# Patient Record
Sex: FEMALE | Race: WHITE | NOT HISPANIC OR LATINO | URBAN - METROPOLITAN AREA
[De-identification: names, ages, dates, MRNs, and addresses within clinical notes are randomized per-mention and may not be internally consistent; named-entity substitution may affect disease eponyms.]

---

## 2017-02-10 ENCOUNTER — OFFICE (OUTPATIENT)
Dept: URBAN - METROPOLITAN AREA CLINIC 11 | Facility: CLINIC | Age: 37
End: 2017-02-10

## 2017-02-10 VITALS
DIASTOLIC BLOOD PRESSURE: 69 MMHG | HEART RATE: 61 BPM | SYSTOLIC BLOOD PRESSURE: 118 MMHG | RESPIRATION RATE: 16 BRPM | HEIGHT: 67 IN | WEIGHT: 160 LBS

## 2017-02-10 DIAGNOSIS — K30 FUNCTIONAL DYSPEPSIA: ICD-10-CM

## 2017-02-10 DIAGNOSIS — K59.00 CONSTIPATION, UNSPECIFIED: ICD-10-CM

## 2017-02-10 PROCEDURE — 99213 OFFICE O/P EST LOW 20 MIN: CPT | Performed by: INTERNAL MEDICINE

## 2017-02-10 RX ORDER — FAMOTIDINE 40 MG/1
TABLET, FILM COATED ORAL
Qty: 30 | Refills: 1 | Status: COMPLETED
Start: 2017-02-10 | End: 2023-10-05

## 2017-02-10 NOTE — SERVICEHPINOTES
"I have been feeling sick after eating for about 10 days."  "It eat the littlest thing and I feel full."  She has an acid regurgitation after eating and also with waking up in the mornings.  She has a feeling of needing to vomiting but doesn't for about an hour.  She will then start belchin and the symptoms have improved.  She has been constipated during this time.  She stated that this was new for her.  She has been taking a dose of Miralax daily with her coffee.  She has been having "little bits of stool" about every couple of days "that is more like diarhea".  She has not had fevers or chills.  She has not had abdominal pain with the new onset of the symptoms.  She has not had any new ill exposures.  She has not had recently new medicaitons.  The symptoms are a bit better after moving her bowels.

## 2017-02-10 NOTE — SERVICENOTES
We discussed her recent on set of issues and a dif dx including dyspepsia, constipation issues, gastritis, ulcer disease, post viral issues, medicaiton effects, enteritis/colitis, tumors etc... At this point, this would seem to be a more benign issue and we can start with her labs and medication changes.  If her sx persist we can consider endoscopic evaluations, CTs, etc...

## 2017-02-12 LAB
CBC COMPLETE BLOOD COUNT W/O DIFF: HEMATOCRIT: 39.7 % (ref 36–48)
CBC COMPLETE BLOOD COUNT W/O DIFF: HEMOGLOBIN: 13.8 G/DL (ref 12–16)
CBC COMPLETE BLOOD COUNT W/O DIFF: MCH: 31.9 PG (ref 25–35)
CBC COMPLETE BLOOD COUNT W/O DIFF: MCHC: 34.8 % (ref 30–38)
CBC COMPLETE BLOOD COUNT W/O DIFF: MCV: 91.7 FL (ref 78–102)
CBC COMPLETE BLOOD COUNT W/O DIFF: PLATELET COUNT: 239 K/UL (ref 150–450)
CBC COMPLETE BLOOD COUNT W/O DIFF: RBC DISTRIBUTION WIDTH: 12.5 % (ref 11.5–16)
CBC COMPLETE BLOOD COUNT W/O DIFF: RED BLOOD CELL COUNT: 4.33 M/UL (ref 4–5.5)
CBC COMPLETE BLOOD COUNT W/O DIFF: WHITE BLOOD CELL COUNT: 5.2 K/UL (ref 4–11)
COMPREHENSIVE METABOLIC PANEL: ALBUMIN: 4.9 G/DL (ref 3.5–5.2)
COMPREHENSIVE METABOLIC PANEL: ALKALINE PHOSPHATASE: 63 U/L (ref 34–115)
COMPREHENSIVE METABOLIC PANEL: CALCIUM TOTAL: 9.3 MG/DL (ref 8.5–10.5)
COMPREHENSIVE METABOLIC PANEL: CARBON DIOXIDE: 22 MEQ/L (ref 21–31)
COMPREHENSIVE METABOLIC PANEL: CHLORIDE: 104 MEQ/L (ref 96–106)
COMPREHENSIVE METABOLIC PANEL: CREATININE: 0.97 MG/DL (ref 0.6–1.3)
COMPREHENSIVE METABOLIC PANEL: FASTING/NON-FASTING: (no result)
COMPREHENSIVE METABOLIC PANEL: GLUCOSE: 95 MG/DL (ref 65–100)
COMPREHENSIVE METABOLIC PANEL: POTASSIUM: 4.6 MEQ/ML (ref 3.5–5.4)
COMPREHENSIVE METABOLIC PANEL: SGOT (AST): 10 U/L — LOW (ref 13–40)
COMPREHENSIVE METABOLIC PANEL: SGPT (ALT): 8 U/L (ref 7–52)
COMPREHENSIVE METABOLIC PANEL: SODIUM: 144 MEQ/L (ref 135–145)
COMPREHENSIVE METABOLIC PANEL: TOTAL BILIRUBIN: 0.8 MG/DL (ref 0.3–1.2)
COMPREHENSIVE METABOLIC PANEL: TOTAL PROTEIN: 7.5 G/DL (ref 6.4–8.3)
COMPREHENSIVE METABOLIC PANEL: UREA NITROGEN: 14 MG/DL (ref 6–20)
Lab: (no result)
URINALYSIS W/MICROSCOPIC: BACTERIA: ABNORMAL
URINALYSIS W/MICROSCOPIC: BILIRUBIN: NEGATIVE
URINALYSIS W/MICROSCOPIC: BLOOD: NEGATIVE
URINALYSIS W/MICROSCOPIC: GLUCOSE: NEGATIVE
URINALYSIS W/MICROSCOPIC: KETONES, URINE: NEGATIVE
URINALYSIS W/MICROSCOPIC: LEUKOCYTE ESTERASE: NEGATIVE
URINALYSIS W/MICROSCOPIC: MUCOUS: (no result)
URINALYSIS W/MICROSCOPIC: NITRITES: NEGATIVE
URINALYSIS W/MICROSCOPIC: PH,URINE: 6 (ref 4.6–8.5)
URINALYSIS W/MICROSCOPIC: PROTEIN: NEGATIVE
URINALYSIS W/MICROSCOPIC: RBC: 0 CELLS/HPF (ref 0–4)
URINALYSIS W/MICROSCOPIC: SPECIFIC GRAVITY: 1.01 (ref 1–1.03)
URINALYSIS W/MICROSCOPIC: SQUAMOUS EPITHELIAL CELL: 1 /HPF
URINALYSIS W/MICROSCOPIC: URINE CLARITY: CLEAR
URINALYSIS W/MICROSCOPIC: URINE COLOR: YELLOW
URINALYSIS W/MICROSCOPIC: WBC: 0 CELLS/HPF (ref 0–2)
URINE CULTURE: C URINE: (no result)

## 2017-02-28 ENCOUNTER — OFFICE (OUTPATIENT)
Dept: URBAN - METROPOLITAN AREA CLINIC 11 | Facility: CLINIC | Age: 37
End: 2017-02-28

## 2017-02-28 VITALS
HEIGHT: 67 IN | HEART RATE: 61 BPM | WEIGHT: 156 LBS | SYSTOLIC BLOOD PRESSURE: 114 MMHG | DIASTOLIC BLOOD PRESSURE: 65 MMHG

## 2017-02-28 DIAGNOSIS — R10.13 EPIGASTRIC PAIN: ICD-10-CM

## 2017-02-28 DIAGNOSIS — R10.12 LEFT UPPER QUADRANT PAIN: ICD-10-CM

## 2017-02-28 PROCEDURE — 99213 OFFICE O/P EST LOW 20 MIN: CPT | Performed by: INTERNAL MEDICINE

## 2017-02-28 NOTE — SERVICEHPINOTES
She stated that the burning and epigastric pain had not improvement but that the constipation has resolved. She has not thought that the Pepcid has helped her sympoms.  She has noted some of the buring after eating dinner and after coffee.  Spicy foods may be more of an issue for her.  The pain after eating has not been consistent.  The pain has been more of a burning symptom.  She has not been taking OTC meds or NSAIDS.  She has thought that smoe of the loss of appeitite may have been related to her new meds.

## 2017-02-28 NOTE — SERVICENOTES
We discussed her symptoms and the need for a further evaluation.  I would like to do the EGD and if negative consider a biliary evaluation.

## 2023-10-05 ENCOUNTER — OFFICE (OUTPATIENT)
Dept: URBAN - METROPOLITAN AREA CLINIC 10 | Facility: CLINIC | Age: 43
End: 2023-10-05

## 2023-10-05 VITALS
OXYGEN SATURATION: 99 % | SYSTOLIC BLOOD PRESSURE: 96 MMHG | HEART RATE: 66 BPM | WEIGHT: 163 LBS | DIASTOLIC BLOOD PRESSURE: 65 MMHG | HEIGHT: 67 IN

## 2023-10-05 DIAGNOSIS — R12 HEARTBURN: ICD-10-CM

## 2023-10-05 DIAGNOSIS — K59.00 CONSTIPATION, UNSPECIFIED: ICD-10-CM

## 2023-10-05 PROCEDURE — 99204 OFFICE O/P NEW MOD 45 MIN: CPT | Performed by: REGISTERED NURSE

## 2023-10-05 RX ORDER — PANTOPRAZOLE SODIUM 20 MG/1
20 TABLET, DELAYED RELEASE ORAL
Qty: 90 | Refills: 3 | Status: ACTIVE
Start: 2023-10-05

## 2023-10-05 RX ORDER — POLYETHYLENE GLYCOL 3350, SODIUM SULFATE, SODIUM CHLORIDE, POTASSIUM CHLORIDE, ASCORBIC ACID, SODIUM ASCORBATE 140-9-5.2G
KIT ORAL
Qty: 1 | Refills: 0 | Status: COMPLETED
Start: 2023-10-05 | End: 2023-11-27

## 2023-12-08 ENCOUNTER — AMBULATORY SURGICAL CENTER (OUTPATIENT)
Dept: URBAN - METROPOLITAN AREA SURGERY 1 | Facility: SURGERY | Age: 43
End: 2023-12-08
Payer: COMMERCIAL

## 2023-12-08 VITALS
WEIGHT: 154 LBS | RESPIRATION RATE: 16 BRPM | SYSTOLIC BLOOD PRESSURE: 101 MMHG | DIASTOLIC BLOOD PRESSURE: 56 MMHG | OXYGEN SATURATION: 99 % | SYSTOLIC BLOOD PRESSURE: 102 MMHG | DIASTOLIC BLOOD PRESSURE: 59 MMHG | OXYGEN SATURATION: 98 % | RESPIRATION RATE: 16 BRPM | HEART RATE: 71 BPM | HEART RATE: 61 BPM | SYSTOLIC BLOOD PRESSURE: 83 MMHG | RESPIRATION RATE: 18 BRPM | SYSTOLIC BLOOD PRESSURE: 102 MMHG | DIASTOLIC BLOOD PRESSURE: 56 MMHG | SYSTOLIC BLOOD PRESSURE: 83 MMHG | SYSTOLIC BLOOD PRESSURE: 101 MMHG | HEART RATE: 71 BPM | DIASTOLIC BLOOD PRESSURE: 59 MMHG | DIASTOLIC BLOOD PRESSURE: 59 MMHG | RESPIRATION RATE: 18 BRPM | HEART RATE: 61 BPM | DIASTOLIC BLOOD PRESSURE: 58 MMHG | DIASTOLIC BLOOD PRESSURE: 62 MMHG | TEMPERATURE: 98.2 F | HEART RATE: 62 BPM | TEMPERATURE: 98.2 F | SYSTOLIC BLOOD PRESSURE: 95 MMHG | DIASTOLIC BLOOD PRESSURE: 55 MMHG | DIASTOLIC BLOOD PRESSURE: 55 MMHG | OXYGEN SATURATION: 98 % | TEMPERATURE: 98.6 F | DIASTOLIC BLOOD PRESSURE: 58 MMHG | HEIGHT: 67 IN | DIASTOLIC BLOOD PRESSURE: 62 MMHG | HEART RATE: 62 BPM | SYSTOLIC BLOOD PRESSURE: 106 MMHG | HEART RATE: 60 BPM | OXYGEN SATURATION: 95 % | SYSTOLIC BLOOD PRESSURE: 83 MMHG | DIASTOLIC BLOOD PRESSURE: 58 MMHG | WEIGHT: 154 LBS | OXYGEN SATURATION: 99 % | HEART RATE: 62 BPM | TEMPERATURE: 98.6 F | HEIGHT: 67 IN | DIASTOLIC BLOOD PRESSURE: 56 MMHG | SYSTOLIC BLOOD PRESSURE: 102 MMHG | SYSTOLIC BLOOD PRESSURE: 106 MMHG | SYSTOLIC BLOOD PRESSURE: 101 MMHG | TEMPERATURE: 98.6 F | HEART RATE: 60 BPM | OXYGEN SATURATION: 99 % | HEIGHT: 67 IN | SYSTOLIC BLOOD PRESSURE: 95 MMHG | HEART RATE: 71 BPM | SYSTOLIC BLOOD PRESSURE: 95 MMHG | OXYGEN SATURATION: 98 % | HEART RATE: 60 BPM | SYSTOLIC BLOOD PRESSURE: 106 MMHG | OXYGEN SATURATION: 95 % | OXYGEN SATURATION: 95 % | RESPIRATION RATE: 16 BRPM | TEMPERATURE: 98.2 F | DIASTOLIC BLOOD PRESSURE: 55 MMHG | DIASTOLIC BLOOD PRESSURE: 62 MMHG | WEIGHT: 154 LBS | HEART RATE: 61 BPM | RESPIRATION RATE: 18 BRPM

## 2023-12-08 DIAGNOSIS — K57.30 DIVERTICULOSIS OF LARGE INTESTINE WITHOUT PERFORATION OR ABS: ICD-10-CM

## 2023-12-08 DIAGNOSIS — Z12.11 ENCOUNTER FOR SCREENING FOR MALIGNANT NEOPLASM OF COLON: ICD-10-CM

## 2023-12-08 PROCEDURE — G0121 COLON CA SCRN NOT HI RSK IND: HCPCS | Performed by: INTERNAL MEDICINE
